# Patient Record
Sex: MALE | Race: WHITE | Employment: FULL TIME | URBAN - METROPOLITAN AREA
[De-identification: names, ages, dates, MRNs, and addresses within clinical notes are randomized per-mention and may not be internally consistent; named-entity substitution may affect disease eponyms.]

---

## 2018-04-20 ENCOUNTER — HOSPITAL ENCOUNTER (OUTPATIENT)
Dept: CARDIOLOGY | Age: 68
Discharge: HOME | End: 2018-04-20
Payer: COMMERCIAL

## 2018-04-20 DIAGNOSIS — R60.0 BILATERAL LOWER EXTREMITY EDEMA: ICD-10-CM

## 2018-04-20 DIAGNOSIS — R06.09 DOE (DYSPNEA ON EXERTION): ICD-10-CM

## 2018-04-20 LAB
LV EF: 65 %
LVEF MODALITY: NORMAL

## 2018-04-20 PROCEDURE — 93306 TTE W/DOPPLER COMPLETE: CPT | Performed by: PSYCHIATRY & NEUROLOGY

## 2020-03-04 ENCOUNTER — APPOINTMENT (OUTPATIENT)
Dept: GENERAL RADIOLOGY | Age: 70
End: 2020-03-04
Payer: MEDICARE

## 2020-03-04 ENCOUNTER — HOSPITAL ENCOUNTER (EMERGENCY)
Age: 70
Discharge: HOME | End: 2020-03-04
Attending: EMERGENCY MEDICINE
Payer: MEDICARE

## 2020-03-04 VITALS
DIASTOLIC BLOOD PRESSURE: 78 MMHG | SYSTOLIC BLOOD PRESSURE: 140 MMHG | TEMPERATURE: 98.5 F | WEIGHT: 215 LBS | OXYGEN SATURATION: 90 % | HEIGHT: 67 IN | RESPIRATION RATE: 18 BRPM | BODY MASS INDEX: 33.74 KG/M2 | HEART RATE: 92 BPM

## 2020-03-04 PROCEDURE — 6370000000 HC RX 637 (ALT 250 FOR IP): Performed by: EMERGENCY MEDICINE

## 2020-03-04 PROCEDURE — 71046 X-RAY EXAM CHEST 2 VIEWS: CPT

## 2020-03-04 PROCEDURE — 99284 EMERGENCY DEPT VISIT MOD MDM: CPT

## 2020-03-04 RX ORDER — SULFAMETHOXAZOLE AND TRIMETHOPRIM 800; 160 MG/1; MG/1
2 TABLET ORAL ONCE
Status: COMPLETED | OUTPATIENT
Start: 2020-03-04 | End: 2020-03-04

## 2020-03-04 RX ORDER — ACETAMINOPHEN, ASPIRIN AND CAFFEINE 250; 250; 65 MG/1; MG/1; MG/1
1 TABLET, FILM COATED ORAL EVERY 6 HOURS PRN
COMMUNITY

## 2020-03-04 RX ORDER — PREDNISONE 10 MG/1
TABLET ORAL
Qty: 10 TABLET | Refills: 0 | Status: SHIPPED | OUTPATIENT
Start: 2020-03-04 | End: 2020-03-14

## 2020-03-04 RX ORDER — SULFAMETHOXAZOLE AND TRIMETHOPRIM 800; 160 MG/1; MG/1
2 TABLET ORAL 2 TIMES DAILY
Qty: 40 TABLET | Refills: 0 | Status: SHIPPED | OUTPATIENT
Start: 2020-03-04 | End: 2020-03-14

## 2020-03-04 RX ORDER — PREDNISONE 20 MG/1
60 TABLET ORAL ONCE
Status: COMPLETED | OUTPATIENT
Start: 2020-03-04 | End: 2020-03-04

## 2020-03-04 RX ADMIN — SULFAMETHOXAZOLE AND TRIMETHOPRIM 2 TABLET: 800; 160 TABLET ORAL at 15:34

## 2020-03-04 RX ADMIN — PREDNISONE 60 MG: 20 TABLET ORAL at 15:33

## 2020-03-04 NOTE — ED NOTES
Pt remains very alert, oriented x4, skin warm and dry, color normal, respirations easy and unlabored, in no distress.      Warren Dawson RN  03/04/20 5913

## 2020-03-04 NOTE — ED PROVIDER NOTES
encounter and vital signs as below have been reviewed. BP (!) 140/78   Pulse 92   Temp 98.5 °F (36.9 °C) (Oral)   Resp 18   Ht 5' 7\" (1.702 m)   Wt 215 lb (97.5 kg)   SpO2 90%   BMI 33.67 kg/m²   Oxygen Saturation Interpretation: Normal      ---------------------------------------------------PHYSICAL EXAM--------------------------------------      Constitutional/General: Alert and oriented x3, well appearing, non toxic in NAD  Head: NC/AT  Eyes: PERRL, EOMI  Mouth: Oropharynx clear, handling secretions, no trismus  Neck: Supple, full ROM, no meningeal signs  Pulmonary: Lungs clear to auscultation bilaterally, no wheezes, rales, or rhonchi. Not in respiratory distress  Cardiovascular:  Regular rate and rhythm, no murmurs, gallops, or rubs. 2+ distal pulses  Abdomen: Soft, non tender, non distended,   Extremities: Moves all extremities x 4. Warm and well perfused  Skin: warm and dry; the patient has some areas of folliculitis especially on the posterior neck on the right and some dry lesions on his upper back  Neurologic: GCS 15,  Psych: Normal Affect      ------------------------------ ED COURSE/MEDICAL DECISION MAKING----------------------  Medications   predniSONE (DELTASONE) tablet 60 mg (has no administration in time range)   sulfamethoxazole-trimethoprim (BACTRIM DS;SEPTRA DS) 800-160 MG per tablet 2 tablet (has no administration in time range)         Medical Decision Making:          Counseling: The emergency provider has spoken with the patient and spouse/SO and discussed todays results, in addition to providing specific details for the plan of care and counseling regarding the diagnosis and prognosis. Questions are answered at this time and they are agreeable with the plan.      --------------------------------- IMPRESSION AND DISPOSITION ---------------------------------    IMPRESSION  1. COPD exacerbation ([facility]    2.  Folliculitis        DISPOSITION  Disposition: Discharge to home  Patient condition is stable                  Kiki Gonzalez MD  03/04/20 5566

## 2020-03-12 ENCOUNTER — HOSPITAL ENCOUNTER (INPATIENT)
Age: 70
LOS: 1 days | Discharge: ANOTHER HOSPITAL | DRG: 683 | End: 2020-03-13
Attending: EMERGENCY MEDICINE | Admitting: INTERNAL MEDICINE
Payer: MEDICARE

## 2020-03-12 ENCOUNTER — APPOINTMENT (OUTPATIENT)
Dept: NUCLEAR MEDICINE | Age: 70
DRG: 683 | End: 2020-03-12
Payer: MEDICARE

## 2020-03-12 ENCOUNTER — APPOINTMENT (OUTPATIENT)
Dept: CT IMAGING | Age: 70
DRG: 683 | End: 2020-03-12
Payer: MEDICARE

## 2020-03-12 VITALS
RESPIRATION RATE: 20 BRPM | SYSTOLIC BLOOD PRESSURE: 125 MMHG | HEART RATE: 89 BPM | OXYGEN SATURATION: 95 % | BODY MASS INDEX: 33.59 KG/M2 | HEIGHT: 67 IN | TEMPERATURE: 97.6 F | WEIGHT: 214 LBS | DIASTOLIC BLOOD PRESSURE: 54 MMHG

## 2020-03-12 PROBLEM — N19 RENAL FAILURE: Status: ACTIVE | Noted: 2020-03-12

## 2020-03-12 LAB
ALBUMIN SERPL-MCNC: 4.1 G/DL (ref 3.5–5.2)
ALP BLD-CCNC: 110 U/L (ref 40–129)
ALT SERPL-CCNC: 36 U/L (ref 0–40)
ANION GAP SERPL CALCULATED.3IONS-SCNC: 24 MMOL/L (ref 7–16)
ANISOCYTOSIS: ABNORMAL
AST SERPL-CCNC: 28 U/L (ref 0–39)
ATYPICAL LYMPHOCYTE RELATIVE PERCENT: 1 % (ref 0–4)
BASOPHILS ABSOLUTE: 0 E9/L (ref 0–0.2)
BASOPHILS RELATIVE PERCENT: 0 % (ref 0–2)
BILIRUB SERPL-MCNC: 0.4 MG/DL (ref 0–1.2)
BLASTS RELATIVE PERCENT: 22 % (ref 0–0)
BUN BLDV-MCNC: 63 MG/DL (ref 8–23)
CALCIUM SERPL-MCNC: 9.7 MG/DL (ref 8.6–10.2)
CHLORIDE BLD-SCNC: 96 MMOL/L (ref 98–107)
CO2: 20 MMOL/L (ref 22–29)
CREAT SERPL-MCNC: 4.6 MG/DL (ref 0.7–1.2)
EKG ATRIAL RATE: 88 BPM
EKG P AXIS: 35 DEGREES
EKG P-R INTERVAL: 164 MS
EKG Q-T INTERVAL: 358 MS
EKG QRS DURATION: 84 MS
EKG QTC CALCULATION (BAZETT): 433 MS
EKG R AXIS: -24 DEGREES
EKG T AXIS: 61 DEGREES
EKG VENTRICULAR RATE: 88 BPM
EOSINOPHILS ABSOLUTE: 5.44 E9/L (ref 0.05–0.5)
EOSINOPHILS RELATIVE PERCENT: 3 % (ref 0–6)
GFR AFRICAN AMERICAN: 15
GFR NON-AFRICAN AMERICAN: 13 ML/MIN/1.73
GLUCOSE BLD-MCNC: 170 MG/DL (ref 74–99)
HCT VFR BLD CALC: 28.3 % (ref 37–54)
HEMOGLOBIN: 9.3 G/DL (ref 12.5–16.5)
INFLUENZA A BY PCR: NOT DETECTED
INFLUENZA B BY PCR: NOT DETECTED
LACTATE DEHYDROGENASE: 632 U/L (ref 135–225)
LACTIC ACID: 4.5 MMOL/L (ref 0.5–2.2)
LACTIC ACID: 4.7 MMOL/L (ref 0.5–2.2)
LV EF: 60 %
LVEF MODALITY: NORMAL
LYMPHOCYTES ABSOLUTE: 21.77 E9/L (ref 1.5–4)
LYMPHOCYTES RELATIVE PERCENT: 11 % (ref 20–42)
MCH RBC QN AUTO: 32.5 PG (ref 26–35)
MCHC RBC AUTO-ENTMCNC: 32.9 % (ref 32–34.5)
MCV RBC AUTO: 99 FL (ref 80–99.9)
METER GLUCOSE: 192 MG/DL (ref 74–99)
MONOCYTES ABSOLUTE: 112.47 E9/L (ref 0.1–0.95)
MONOCYTES RELATIVE PERCENT: 62 % (ref 2–12)
NEUTROPHILS ABSOLUTE: 1.81 E9/L (ref 1.8–7.3)
NEUTROPHILS RELATIVE PERCENT: 1 % (ref 43–80)
PDW BLD-RTO: 17.9 FL (ref 11.5–15)
PHOSPHORUS: 5.9 MG/DL (ref 2.5–4.5)
PLATELET # BLD: 41 E9/L (ref 130–450)
PLATELET CONFIRMATION: NORMAL
PMV BLD AUTO: 10.9 FL (ref 7–12)
POTASSIUM REFLEX MAGNESIUM: 5.1 MMOL/L (ref 3.5–5)
PRO-BNP: 1579 PG/ML (ref 0–125)
PROCALCITONIN: 0.73 NG/ML (ref 0–0.08)
RBC # BLD: 2.86 E12/L (ref 3.8–5.8)
SODIUM BLD-SCNC: 140 MMOL/L (ref 132–146)
TOTAL PROTEIN: 7.1 G/DL (ref 6.4–8.3)
TROPONIN: <0.01 NG/ML (ref 0–0.03)
URIC ACID, SERUM: 19 MG/DL (ref 3.4–7)
WBC # BLD: 181.4 E9/L (ref 4.5–11.5)

## 2020-03-12 PROCEDURE — A9558 XE133 XENON 10MCI: HCPCS | Performed by: RADIOLOGY

## 2020-03-12 PROCEDURE — 2580000003 HC RX 258: Performed by: EMERGENCY MEDICINE

## 2020-03-12 PROCEDURE — 87502 INFLUENZA DNA AMP PROBE: CPT

## 2020-03-12 PROCEDURE — 93306 TTE W/DOPPLER COMPLETE: CPT

## 2020-03-12 PROCEDURE — 71250 CT THORAX DX C-: CPT

## 2020-03-12 PROCEDURE — 84100 ASSAY OF PHOSPHORUS: CPT

## 2020-03-12 PROCEDURE — 6360000002 HC RX W HCPCS: Performed by: EMERGENCY MEDICINE

## 2020-03-12 PROCEDURE — 99285 EMERGENCY DEPT VISIT HI MDM: CPT

## 2020-03-12 PROCEDURE — 3430000000 HC RX DIAGNOSTIC RADIOPHARMACEUTICAL: Performed by: RADIOLOGY

## 2020-03-12 PROCEDURE — 6360000002 HC RX W HCPCS: Performed by: NURSE PRACTITIONER

## 2020-03-12 PROCEDURE — 82962 GLUCOSE BLOOD TEST: CPT

## 2020-03-12 PROCEDURE — 84484 ASSAY OF TROPONIN QUANT: CPT

## 2020-03-12 PROCEDURE — 83605 ASSAY OF LACTIC ACID: CPT

## 2020-03-12 PROCEDURE — 6370000000 HC RX 637 (ALT 250 FOR IP): Performed by: EMERGENCY MEDICINE

## 2020-03-12 PROCEDURE — 94664 DEMO&/EVAL PT USE INHALER: CPT

## 2020-03-12 PROCEDURE — 85025 COMPLETE CBC W/AUTO DIFF WBC: CPT

## 2020-03-12 PROCEDURE — A9540 TC99M MAA: HCPCS | Performed by: RADIOLOGY

## 2020-03-12 PROCEDURE — 94761 N-INVAS EAR/PLS OXIMETRY MLT: CPT

## 2020-03-12 PROCEDURE — 6370000000 HC RX 637 (ALT 250 FOR IP): Performed by: INTERNAL MEDICINE

## 2020-03-12 PROCEDURE — 96375 TX/PRO/DX INJ NEW DRUG ADDON: CPT

## 2020-03-12 PROCEDURE — 93010 ELECTROCARDIOGRAM REPORT: CPT | Performed by: INTERNAL MEDICINE

## 2020-03-12 PROCEDURE — 0100U HC RESPIRPTHGN MULT REV TRANS & AMP PRB TECH 21 TRGT: CPT

## 2020-03-12 PROCEDURE — 80053 COMPREHEN METABOLIC PANEL: CPT

## 2020-03-12 PROCEDURE — 84550 ASSAY OF BLOOD/URIC ACID: CPT

## 2020-03-12 PROCEDURE — 96374 THER/PROPH/DIAG INJ IV PUSH: CPT

## 2020-03-12 PROCEDURE — 94640 AIRWAY INHALATION TREATMENT: CPT

## 2020-03-12 PROCEDURE — 51702 INSERT TEMP BLADDER CATH: CPT

## 2020-03-12 PROCEDURE — 83615 LACTATE (LD) (LDH) ENZYME: CPT

## 2020-03-12 PROCEDURE — 87040 BLOOD CULTURE FOR BACTERIA: CPT

## 2020-03-12 PROCEDURE — 93005 ELECTROCARDIOGRAM TRACING: CPT | Performed by: PHYSICIAN ASSISTANT

## 2020-03-12 PROCEDURE — 2060000000 HC ICU INTERMEDIATE R&B

## 2020-03-12 PROCEDURE — 84145 PROCALCITONIN (PCT): CPT

## 2020-03-12 PROCEDURE — 83880 ASSAY OF NATRIURETIC PEPTIDE: CPT

## 2020-03-12 PROCEDURE — 2580000003 HC RX 258: Performed by: INTERNAL MEDICINE

## 2020-03-12 PROCEDURE — 78582 LUNG VENTILAT&PERFUS IMAGING: CPT

## 2020-03-12 RX ORDER — NICOTINE POLACRILEX 4 MG
15 LOZENGE BUCCAL PRN
Status: DISCONTINUED | OUTPATIENT
Start: 2020-03-12 | End: 2020-03-13 | Stop reason: HOSPADM

## 2020-03-12 RX ORDER — IPRATROPIUM BROMIDE AND ALBUTEROL SULFATE 2.5; .5 MG/3ML; MG/3ML
1 SOLUTION RESPIRATORY (INHALATION)
Status: DISCONTINUED | OUTPATIENT
Start: 2020-03-13 | End: 2020-03-12

## 2020-03-12 RX ORDER — DILTIAZEM HYDROCHLORIDE 300 MG/1
300 CAPSULE, EXTENDED RELEASE ORAL DAILY
Status: DISCONTINUED | OUTPATIENT
Start: 2020-03-13 | End: 2020-03-13 | Stop reason: HOSPADM

## 2020-03-12 RX ORDER — ALBUTEROL SULFATE 2.5 MG/3ML
2.5 SOLUTION RESPIRATORY (INHALATION) 3 TIMES DAILY PRN
Status: DISCONTINUED | OUTPATIENT
Start: 2020-03-12 | End: 2020-03-13 | Stop reason: HOSPADM

## 2020-03-12 RX ORDER — SODIUM CHLORIDE 0.9 % (FLUSH) 0.9 %
10 SYRINGE (ML) INJECTION PRN
Status: DISCONTINUED | OUTPATIENT
Start: 2020-03-12 | End: 2020-03-13 | Stop reason: HOSPADM

## 2020-03-12 RX ORDER — LEVOTHYROXINE SODIUM 0.03 MG/1
25 TABLET ORAL
COMMUNITY

## 2020-03-12 RX ORDER — ALBUTEROL SULFATE 90 UG/1
2 AEROSOL, METERED RESPIRATORY (INHALATION) 3 TIMES DAILY PRN
Status: DISCONTINUED | OUTPATIENT
Start: 2020-03-12 | End: 2020-03-12 | Stop reason: CLARIF

## 2020-03-12 RX ORDER — DEXTROSE MONOHYDRATE 50 MG/ML
100 INJECTION, SOLUTION INTRAVENOUS PRN
Status: DISCONTINUED | OUTPATIENT
Start: 2020-03-12 | End: 2020-03-13 | Stop reason: HOSPADM

## 2020-03-12 RX ORDER — ARFORMOTEROL TARTRATE 15 UG/2ML
15 SOLUTION RESPIRATORY (INHALATION) 2 TIMES DAILY
Status: DISCONTINUED | OUTPATIENT
Start: 2020-03-12 | End: 2020-03-13 | Stop reason: HOSPADM

## 2020-03-12 RX ORDER — IPRATROPIUM BROMIDE AND ALBUTEROL SULFATE 2.5; .5 MG/3ML; MG/3ML
1 SOLUTION RESPIRATORY (INHALATION)
Status: COMPLETED | OUTPATIENT
Start: 2020-03-12 | End: 2020-03-12

## 2020-03-12 RX ORDER — BUDESONIDE 0.5 MG/2ML
0.5 INHALANT ORAL 2 TIMES DAILY
Status: DISCONTINUED | OUTPATIENT
Start: 2020-03-12 | End: 2020-03-13 | Stop reason: HOSPADM

## 2020-03-12 RX ORDER — XENON XE-133 10 MCI/1
20 GAS RESPIRATORY (INHALATION)
Status: COMPLETED | OUTPATIENT
Start: 2020-03-12 | End: 2020-03-12

## 2020-03-12 RX ORDER — SODIUM CHLORIDE 0.9 % (FLUSH) 0.9 %
10 SYRINGE (ML) INJECTION EVERY 12 HOURS SCHEDULED
Status: DISCONTINUED | OUTPATIENT
Start: 2020-03-12 | End: 2020-03-13 | Stop reason: HOSPADM

## 2020-03-12 RX ORDER — IPRATROPIUM BROMIDE AND ALBUTEROL SULFATE 2.5; .5 MG/3ML; MG/3ML
1 SOLUTION RESPIRATORY (INHALATION) EVERY 4 HOURS PRN
Status: DISCONTINUED | OUTPATIENT
Start: 2020-03-12 | End: 2020-03-13 | Stop reason: HOSPADM

## 2020-03-12 RX ORDER — LEVOTHYROXINE SODIUM 0.03 MG/1
25 TABLET ORAL
Status: DISCONTINUED | OUTPATIENT
Start: 2020-03-13 | End: 2020-03-13 | Stop reason: HOSPADM

## 2020-03-12 RX ORDER — SODIUM CHLORIDE 9 MG/ML
1000 INJECTION, SOLUTION INTRAVENOUS CONTINUOUS
Status: DISCONTINUED | OUTPATIENT
Start: 2020-03-12 | End: 2020-03-13 | Stop reason: HOSPADM

## 2020-03-12 RX ORDER — 0.9 % SODIUM CHLORIDE 0.9 %
1000 INTRAVENOUS SOLUTION INTRAVENOUS ONCE
Status: COMPLETED | OUTPATIENT
Start: 2020-03-12 | End: 2020-03-12

## 2020-03-12 RX ORDER — METOCLOPRAMIDE HYDROCHLORIDE 5 MG/ML
10 INJECTION INTRAMUSCULAR; INTRAVENOUS ONCE
Status: COMPLETED | OUTPATIENT
Start: 2020-03-12 | End: 2020-03-12

## 2020-03-12 RX ORDER — DEXTROSE MONOHYDRATE 25 G/50ML
12.5 INJECTION, SOLUTION INTRAVENOUS PRN
Status: DISCONTINUED | OUTPATIENT
Start: 2020-03-12 | End: 2020-03-13 | Stop reason: HOSPADM

## 2020-03-12 RX ORDER — 0.9 % SODIUM CHLORIDE 0.9 %
500 INTRAVENOUS SOLUTION INTRAVENOUS ONCE
Status: COMPLETED | OUTPATIENT
Start: 2020-03-12 | End: 2020-03-12

## 2020-03-12 RX ORDER — IPRATROPIUM BROMIDE AND ALBUTEROL SULFATE 2.5; .5 MG/3ML; MG/3ML
1 SOLUTION RESPIRATORY (INHALATION)
Status: DISCONTINUED | OUTPATIENT
Start: 2020-03-12 | End: 2020-03-13 | Stop reason: HOSPADM

## 2020-03-12 RX ORDER — ONDANSETRON 2 MG/ML
4 INJECTION INTRAMUSCULAR; INTRAVENOUS ONCE
Status: COMPLETED | OUTPATIENT
Start: 2020-03-12 | End: 2020-03-12

## 2020-03-12 RX ADMIN — Medication 6 MILLICURIE: at 16:27

## 2020-03-12 RX ADMIN — IPRATROPIUM BROMIDE AND ALBUTEROL SULFATE 1 AMPULE: .5; 3 SOLUTION RESPIRATORY (INHALATION) at 13:58

## 2020-03-12 RX ADMIN — BUDESONIDE 500 MCG: 0.5 SUSPENSION RESPIRATORY (INHALATION) at 21:57

## 2020-03-12 RX ADMIN — SODIUM CHLORIDE 1000 ML: 9 INJECTION, SOLUTION INTRAVENOUS at 15:52

## 2020-03-12 RX ADMIN — IPRATROPIUM BROMIDE AND ALBUTEROL SULFATE 1 AMPULE: .5; 3 SOLUTION RESPIRATORY (INHALATION) at 13:51

## 2020-03-12 RX ADMIN — ONDANSETRON 4 MG: 2 INJECTION INTRAMUSCULAR; INTRAVENOUS at 15:01

## 2020-03-12 RX ADMIN — SODIUM CHLORIDE 500 ML: 9 INJECTION, SOLUTION INTRAVENOUS at 14:50

## 2020-03-12 RX ADMIN — SODIUM CHLORIDE 1000 ML: 9 INJECTION, SOLUTION INTRAVENOUS at 18:51

## 2020-03-12 RX ADMIN — INSULIN LISPRO 1 UNITS: 100 INJECTION, SOLUTION INTRAVENOUS; SUBCUTANEOUS at 22:02

## 2020-03-12 RX ADMIN — XENON XE-133 20 MILLICURIE: 10 GAS RESPIRATORY (INHALATION) at 16:20

## 2020-03-12 RX ADMIN — IPRATROPIUM BROMIDE AND ALBUTEROL SULFATE 1 AMPULE: .5; 3 SOLUTION RESPIRATORY (INHALATION) at 13:45

## 2020-03-12 RX ADMIN — IPRATROPIUM BROMIDE AND ALBUTEROL SULFATE 1 AMPULE: .5; 3 SOLUTION RESPIRATORY (INHALATION) at 21:57

## 2020-03-12 RX ADMIN — METOCLOPRAMIDE 10 MG: 5 INJECTION, SOLUTION INTRAMUSCULAR; INTRAVENOUS at 16:55

## 2020-03-12 RX ADMIN — ARFORMOTEROL TARTRATE 15 MCG: 15 SOLUTION RESPIRATORY (INHALATION) at 21:56

## 2020-03-12 ASSESSMENT — PAIN DESCRIPTION - DESCRIPTORS: DESCRIPTORS: TENDER

## 2020-03-12 ASSESSMENT — PAIN DESCRIPTION - PAIN TYPE: TYPE: ACUTE PAIN

## 2020-03-12 ASSESSMENT — PAIN DESCRIPTION - LOCATION: LOCATION: CHEST

## 2020-03-12 ASSESSMENT — PAIN SCALES - GENERAL
PAINLEVEL_OUTOF10: 0
PAINLEVEL_OUTOF10: 0
PAINLEVEL_OUTOF10: 4

## 2020-03-12 ASSESSMENT — PAIN DESCRIPTION - FREQUENCY: FREQUENCY: CONTINUOUS

## 2020-03-12 NOTE — ED PROVIDER NOTES
range)   dilTIAZem MOTT [facility]) extended release capsule 300 mg (has no administration in time range)   levothyroxine (SYNTHROID) tablet 25 mcg (has no administration in time range)   insulin lispro (HUMALOG) injection vial 0-6 Units (has no administration in time range)   insulin lispro (HUMALOG) injection vial 0-3 Units (has no administration in time range)   glucose (GLUTOSE) 40 % oral gel 15 g (has no administration in time range)   dextrose 50 % IV solution (has no administration in time range)   glucagon (rDNA) injection 1 mg (has no administration in time range)   dextrose 5 % solution (has no administration in time range)   ipratropium-albuterol (DUONEB) nebulizer solution 1 ampule (has no administration in time range)   albuterol (PROVENTIL) nebulizer solution 2.5 mg (has no administration in time range)   ipratropium-albuterol (DUONEB) nebulizer solution 1 ampule (1 ampule Inhalation Given 3/12/20 1358)   0.9 % sodium chloride bolus (0 mLs Intravenous Stopped 3/12/20 1537)   0.9 % sodium chloride bolus (0 mLs Intravenous Stopped 3/12/20 1734)   ondansetron (ZOFRAN) injection 4 mg (4 mg Intravenous Given 3/12/20 1501)   metoclopramide (REGLAN) injection 10 mg (10 mg Intravenous Given 3/12/20 1655)   xenon xe 287 gas 20 millicurie (20 millicuries Inhalation Given 3/12/20 1620)   technetium albumin aggregated (MAA) solution 6 millicurie (6 millicuries Intravenous Given 3/12/20 1627)       Medical Decision Making:    Patient is a 68-year-old male with history of COPD presenting with shortness of breath and nonproductive cough. He was hemodynamically stable and afebrile in the ED. Oxygen saturations were around 92% on room air, which patient believes are near his baseline. He was nontoxic on evaluation. He was placed on a cardiac monitor. I reviewed and interpreted labs. Patient has new renal failure with a creatinine of 4.6, last creatinine 1 when reviewing prior labs. Potassium was 5.1.   Also had mild

## 2020-03-12 NOTE — PROGRESS NOTES
Database complete. Medications reconciled. Care plans and education initiated. Pulmonologist is Dr. Heaevnly Leonardo.

## 2020-03-12 NOTE — CONSULTS
Estrella Dupont M.D.,[facility]  Corrie Nogueira D.O., F.A.C.O.GEORGE., Payton Beth M.D. Vlad Ferrari M.D., Kristina Hemphill M.D. Patient:  Alma Freeman 68 y.o. male MRN: [id number]     Date of Service: 3/12/2020      PULMONARY CONSULTATION    Reason for Consultation: shortness of breath  Referring Physician: Dr. Christos Vail with the referring physician will be sent via the electronic medical record. Chief Complaint: shortness of breath    CODE STATUS: Full    SUBJECTIVE:  HPI:  Alma Freeman is a 68 y.o. male known in our practice to Dr. Deo Tolbert. He has a history of stage 3 severe COPD by GOLD classification, a smoking history in remission, diabetes, and hypertension. He presented to the ED after progressive shortness of breath for the past few weeks, with a nonproductive cough, nausea and dry heaves. He had worked with a chemical at work a couple of weeks ago and thought that might have exacerbated it, but it continued to get worse. He used his inhaler at home without relief. He was recently on Bactrim and Prednisone, steroids were completed, then shortness of breath began to get worse after completion. He does have some complaints of sinus drainage. He does not use oxygen or PAP therapy at home. He has had an 18 pound weight loss in the past month.         Past Medical History:   Diagnosis Date    Asthma     Cholelithiasis     [facility]chronic [facility] ([facility]     Diabetes mellitus ([facility]     Diarrhea     History of blood transfusion     Hyperlipidemia     Hypertension        Past Surgical History:   Procedure Laterality Date    CARDIAC CATHETERIZATION  07/26/2017    Dr. Roca  COLONOSCOPY  03/07/2017    EYE SURGERY      cataract with lens implant ou    LEG SURGERY Left 1964       Family History   Problem Relation Age of Onset    Heart Attack Mother     Cancer Sister     Arthritis Brother        Social History:   Social History Socioeconomic History    Marital status:      Spouse name: Not on file    Number of children: Not on file    Years of education: Not on file    Highest education level: Not on file   Occupational History    Occupation: plastic factory -[employer]   Social Needs    Financial resource strain: Not on file    Food insecurity     Worry: Not on file     Inability: Not on file   [employer] needs     Medical: Not on file     Non-medical: Not on file   Tobacco Use    Smoking status: Former Smoker     Packs/day: 1.25     Years: 35.00     Pack years: 43.75     Types: Cigarettes     Last attempt to quit: 2000     Years since quittin.4    Smokeless tobacco: Never Used   Substance and Sexual Activity    Alcohol use: No    Drug use: No    Sexual activity: Not Currently   Lifestyle    Physical activity     Days per week: Not on file     Minutes per session: Not on file    Stress: Not on file   Relationships    Social connections     Talks on phone: Not on file     Gets together: Not on file     Attends Advent service: Not on file     Active member of club or organization: Not on file     Attends meetings of clubs or organizations: Not on file     Relationship status: Not on file    Intimate partner violence     Fear of current or ex partner: Not on file     Emotionally abused: Not on file     Physically abused: Not on file     Forced sexual activity: Not on file   Other Topics Concern    Not on file   Social History Narrative    Not on file     Smoking history: The patient is a Past smoker of 1.25 packs a day for 35 years. Pack year equal 43.75. ETOH:   reports no history of alcohol use. Exposures: There  is not history of TB or TB exposure. There is not asbestos or silica dust exposure. The patient reports does not have coal, foundry, quarry or [employer] exposure. Recent travel history none. There is not  history of recreational or IV drug use.  There is not hot tub exposure. The patient does not have any exotic pets, turtles or exotic birds. Vaccines:    Influenza: Indicated for current flu vaccination season Oct. to Feb.  Pneumococcal Polysaccharide:  Up-to-date; approximate date: 2015  Immunization History   Administered Date(s) Administered    Influenza Vaccine, unspecified formulation 10/08/2016, 10/24/2018    Pneumococcal Conjugate Vaccine 10/08/2015        Home Meds: Not in a hospital admission. CURRENT MEDS :  Scheduled Meds:   sodium chloride  1,000 mL Intravenous Once    metoclopramide  10 mg Intravenous Once       Continuous Infusions:      No Known Allergies    REVIEW OF SYSTEMS:  Constitutional: Denies fever, weight loss, night sweats, and fatigue  Skin: Denies pigmentation, dark lesions, and rashes   HEENT: Denies hearing loss, tinnitus, ear drainage, epistaxis, sore throat, and hoarseness. Cardiovascular: Denies palpitations, chest pain, and chest pressure. Respiratory: Denies cough, dyspnea at rest, hemoptysis, apnea, and choking. Gastrointestinal: Denies nausea, vomiting, poor appetite, diarrhea, heartburn or reflux  Genitourinary: Denies dysuria, frequency, urgency or hematuria  Musculoskeletal: Denies myalgias, muscle weakness, and bone pain  Neurological: Denies dizziness, vertigo, headache, and focal weakness  Psychological: Denies anxiety and depression  Endocrine: Denies heat intolerance and cold intolerance  Hematopoietic/Lymphatic: Denies bleeding problems and blood transfusions    OBJECTIVE:   /63   Pulse 88   Temp 98 °F (36.7 °C)   Resp 16   Ht 5' 7\" (1.702 m)   Wt 214 lb (97.1 kg)   SpO2 98%   BMI 33.52 kg/m²   SpO2 Readings from Last 1 Encounters:   03/12/20 98%        I/O:  No intake or output data in the 24 hours ending 03/12/20 8079    Physical Exam:  General: The patient is lying in bed comfortably without any distress.   Breathing is not labored  HEENT: Pupils are equal round and reactive to light, there are no oral INR  Recent Labs     03/12/20  1343   PROBNP 1,579*     Recent Labs     03/12/20  1343   TROPONINI <0.01     No results for input(s): PROCAL in the last 72 hours. This SmartLink has not been configured with any valid records. Assessment:  1. Shortness of breath  2. COPD, severe, Gold Stage 3  3. KARMEN with uremia   4. Pericardial effusion   5. Profound leukocytosis with thrombocytopenia ? Malignancy  6. Lactic acidosis   7. Diabetes mellitus type 2  8. Hypertension  Plan:  1. VQ scan pending  2. Budesonide, Brovana  3. Duonebs q4 PRN  4. Check procalcitonin, respiratory culture, bacterial antigens, respiratory array panel      Thank you for allowing me to participate in the care of Robbie Garay. Please feel free to call with questions. This plan of care was reviewed in collaboration with Dr. Shreya Calzada    Electronically signed by ADRIANA Regan CNP on 3/12/2020 at 4:19 PM      I personally saw, examined, and cared for the patient. Labs, medications, radiographs reviewed. I agree with history exam and plans detailed in NP note. Dyspnea likely from hematologic abnormalities and fluid overload   Wean o2 as tolerated   Bronchodilators   Echo   Oncology and nephrology consult     Merlinda Heron, M.D.    Pulmonary/Critical Care Medicine

## 2020-03-13 ENCOUNTER — APPOINTMENT (OUTPATIENT)
Dept: ULTRASOUND IMAGING | Age: 70
DRG: 683 | End: 2020-03-13
Payer: MEDICARE

## 2020-03-13 LAB
ADENOVIRUS BY PCR: NOT DETECTED
BORDETELLA PARAPERTUSSIS BY PCR: NOT DETECTED
BORDETELLA PERTUSSIS BY PCR: NOT DETECTED
CHLAMYDOPHILIA PNEUMONIAE BY PCR: NOT DETECTED
CORONAVIRUS 229E BY PCR: NOT DETECTED
CORONAVIRUS HKU1 BY PCR: NOT DETECTED
CORONAVIRUS NL63 BY PCR: NOT DETECTED
CORONAVIRUS OC43 BY PCR: NOT DETECTED
HUMAN METAPNEUMOVIRUS BY PCR: NOT DETECTED
HUMAN RHINOVIRUS/ENTEROVIRUS BY PCR: NOT DETECTED
INFLUENZA A BY PCR: NOT DETECTED
INFLUENZA B BY PCR: NOT DETECTED
MYCOPLASMA PNEUMONIAE BY PCR: NOT DETECTED
PARAINFLUENZA VIRUS 1 BY PCR: NOT DETECTED
PARAINFLUENZA VIRUS 2 BY PCR: NOT DETECTED
PARAINFLUENZA VIRUS 3 BY PCR: NOT DETECTED
PARAINFLUENZA VIRUS 4 BY PCR: NOT DETECTED
RESPIRATORY SYNCYTIAL VIRUS BY PCR: NOT DETECTED

## 2020-03-13 PROCEDURE — 2700000000 HC OXYGEN THERAPY PER DAY

## 2020-03-13 NOTE — PROGRESS NOTES
Jose Alberto Ahmadi was ordered diltiazem MOTT [facility]) which is a nonformulary medication (Formulary alternative is Cardizem CD). The patient has indicated that the home supply of this medication will be brought in to the hospital for inpatient use. If the medication has not been administered by 1400 on the following day from the time the order was placed, a pharmacist will follow-up with the nurse of the patient to assess the capability of the patient to bring in the medication. If it is determined that the patient cannot supply the medication and it is not available to be dispensed from the pharmacy, a call will be placed to the ordering provider to discuss alternative options.     Garold Burkitt, [facility]  3/12/2020  9:07 PM

## 2020-03-13 NOTE — H&P
The patient presented to the office yesterday for ER  FU appointment,  He had SOB and nausea and weakness,he was having sever bronchospasm & exacerbation of COPD. The pateint was sent to ER ,lab indicated AML&KARMEN&the pt was admitted,and oncology consulted,DR Margot Lorenzo recommended transfere to [facility] for AML TREATMENT  CASE DISCUSSED WITH ONCOLOGY AT [facility],pt.  Accepted &transferred very early today

## 2020-03-17 LAB
BLOOD CULTURE, ROUTINE: NORMAL
CULTURE, BLOOD 2: NORMAL

## 2020-04-02 NOTE — H&P
Linda Radford MD   Physician   Internal Medicine   H&P   Signed   Date of Service:  3/13/2020  7:18 AM               Signed             Show:Clear all  [x]Manual[]Template[]Copied    Added by:  Ting Ramirez MD    []Mariano for details  The patient presented to the office yesterday for ER  FU appointment,  He had SOB and nausea and weakness,he was having sever bronchospasm & exacerbation of COPD. The pateint was sent to ER ,lab indicated AML&KARMEN&the pt was admitted,and oncology consulted,DR Alexandra Patel recommended transfere to [facility] for AML TREATMENT  CASE DISCUSSED WITH ONCOLOGY AT [facility],pt.  Accepted &transferred very early today

## 2021-02-04 VITALS
RESPIRATION RATE: 18 BRPM | SYSTOLIC BLOOD PRESSURE: 128 MMHG | HEART RATE: 61 BPM | DIASTOLIC BLOOD PRESSURE: 82 MMHG | HEIGHT: 66 IN | BODY MASS INDEX: 36.48 KG/M2 | WEIGHT: 227 LBS

## 2021-02-04 RX ORDER — DILTIAZEM HYDROCHLORIDE 300 MG/1
300 CAPSULE, COATED, EXTENDED RELEASE ORAL DAILY
COMMUNITY